# Patient Record
Sex: MALE | Race: WHITE | NOT HISPANIC OR LATINO | Employment: STUDENT | URBAN - METROPOLITAN AREA
[De-identification: names, ages, dates, MRNs, and addresses within clinical notes are randomized per-mention and may not be internally consistent; named-entity substitution may affect disease eponyms.]

---

## 2019-07-23 ENCOUNTER — CLINICAL SUPPORT (OUTPATIENT)
Dept: PEDIATRIC CARDIOLOGY | Facility: CLINIC | Age: 19
End: 2019-07-23
Attending: PEDIATRICS
Payer: COMMERCIAL

## 2019-07-23 ENCOUNTER — OFFICE VISIT (OUTPATIENT)
Dept: PEDIATRIC CARDIOLOGY | Facility: CLINIC | Age: 19
End: 2019-07-23
Payer: COMMERCIAL

## 2019-07-23 ENCOUNTER — CLINICAL SUPPORT (OUTPATIENT)
Dept: PEDIATRIC CARDIOLOGY | Facility: CLINIC | Age: 19
End: 2019-07-23
Payer: COMMERCIAL

## 2019-07-23 VITALS
HEIGHT: 74 IN | OXYGEN SATURATION: 98 % | RESPIRATION RATE: 12 BRPM | WEIGHT: 189.5 LBS | HEART RATE: 60 BPM | BODY MASS INDEX: 24.32 KG/M2 | DIASTOLIC BLOOD PRESSURE: 58 MMHG | SYSTOLIC BLOOD PRESSURE: 118 MMHG

## 2019-07-23 DIAGNOSIS — R40.0 SLEEPINESS: ICD-10-CM

## 2019-07-23 DIAGNOSIS — R94.31 ABNORMAL EKG: Primary | ICD-10-CM

## 2019-07-23 DIAGNOSIS — R94.31 ABNORMAL EKG: ICD-10-CM

## 2019-07-23 DIAGNOSIS — R00.1 BRADYCARDIA: ICD-10-CM

## 2019-07-23 DIAGNOSIS — I45.10 RBBB: Primary | ICD-10-CM

## 2019-07-23 DIAGNOSIS — R59.0 LYMPHADENOPATHY, INGUINAL: ICD-10-CM

## 2019-07-23 DIAGNOSIS — I45.10 RBBB: ICD-10-CM

## 2019-07-23 PROCEDURE — 93000 PR ELECTROCARDIOGRAM, COMPLETE: ICD-10-PCS | Mod: S$GLB,,, | Performed by: PEDIATRICS

## 2019-07-23 PROCEDURE — 93227 XTRNL ECG REC<48 HR R&I: CPT | Mod: S$GLB,,, | Performed by: PEDIATRICS

## 2019-07-23 PROCEDURE — 3008F PR BODY MASS INDEX (BMI) DOCUMENTED: ICD-10-PCS | Mod: CPTII,S$GLB,, | Performed by: PEDIATRICS

## 2019-07-23 PROCEDURE — 99205 PR OFFICE/OUTPT VISIT, NEW, LEVL V, 60-74 MIN: ICD-10-PCS | Mod: 25,S$GLB,, | Performed by: PEDIATRICS

## 2019-07-23 PROCEDURE — 93000 ELECTROCARDIOGRAM COMPLETE: CPT | Mod: S$GLB,,, | Performed by: PEDIATRICS

## 2019-07-23 PROCEDURE — 93227: ICD-10-PCS | Mod: S$GLB,,, | Performed by: PEDIATRICS

## 2019-07-23 PROCEDURE — 99205 OFFICE O/P NEW HI 60 MIN: CPT | Mod: 25,S$GLB,, | Performed by: PEDIATRICS

## 2019-07-23 PROCEDURE — 3008F BODY MASS INDEX DOCD: CPT | Mod: CPTII,S$GLB,, | Performed by: PEDIATRICS

## 2019-07-23 NOTE — PATIENT INSTRUCTIONS
Jonel Gu MD  Pediatric Cardiology  [address] [place] [address]  [phone]    Name: Baudilio Garza                   : 2000    Diagnosis:   1. RBBB    2. Bradycardia    3. Sleepiness    4. Lymphadenopathy, inguinal        Orders placed this encounter  Orders Placed This Encounter   Procedures    Holter Monitor - 48 hour Pediatrics    Cardiac treadmill stress test-Pediatrics       NEXT APPOINTMENT  Follow up in about 3 months (around 10/23/2019) for follow-up appointment, /, Stress test, Holter.    Special Testing Instructions: None.    Follow up with the primary care provider for the following issues: Nothing identified.             Plan:  1. Activity:No special precautions and may participate in age-appropriate activities.    2. The patient should see a dentist every 6 months for routine dental care.    No spontaneous bacterial endocarditis prophylaxis is required.    3. If anesthesia is needed for surgery, no special precautions from a cardiovascular standpoint are necessary.    Other recommendations:           General Guidelines    PCP: Shahram Dominguez MD  PCP Phone Number: [phone]    · If you have an emergency or you think you have an emergency, go to the nearest emergency room!     · Breathing too fast, doesnt look right, consistently not eating well, your child needs to be checked. These are general indications that your child is not feeling well. This may be caused by anything, a stomach virus, an ear ache or heart disease, so please call Shahram Dominguez MD. If Shahram Dominguez MD thinks you need to be checked for your heart, they will let us know.     · If your child experiences a rapid or very slow heart rate and has the following symptoms, call Shahram Dominguez MD or go to the nearest emergency room.   · unexplained chest pain   · does not look right   · feels like they are going to pass out   · actually passes out for unexplained reasons   · weakness  or fatigue   · shortness of breath  or breathing fast   · consistent poor feeding     · If your child experiences a rapid or very slow heart rate that lasts longer than 30 minutes call Shahram Dominguez MD or go to the nearest emergency room.     · If your child feels like they are going to pass out - have them sit down or lay down immediately. Raise the feet above the head (prop the feet on a chair or the wall) until the feeling passes. Slowly allow the child to sit, then stand. If the feeling returns, lay back down and start over.              It is very important that you notify Shahram Dominguez MD first. Shahram Dominguez MD or the ER Physician can reach Dr. Gu at the office or through [facility]ICU at [phone] as needed.

## 2019-07-23 NOTE — LETTER
July 24, 2019      Shahram Dominguez MD  [address] [facility] [address] Cardiology  [address]  [facility] [address]  Phone: [phone]  Fax: [phone]          Patient: Baudilio Garza   MR Number: [id number]   YOB: 2000   Date of Visit: 7/23/2019       Dear Dr. Shahram Dominguez:    Thank you for referring Baudilio Garza to me for evaluation. Attached you will find relevant portions of my assessment and plan of care.    If you have questions, please do not hesitate to call me. I look forward to following Baudilio Garza along with you.    Sincerely,    Jonel Gu MD    Enclosure  CC:  No Recipients    If you would like to receive this communication electronically, please contact [email or web address] or [phone] to request more information on [employer] access.    For providers and/or their staff who would like to refer a patient to [facility], please contact us through our one-stop-shop provider referral line, [facility], [place], at [phone].    If you feel you have received this communication in error or would no longer like to receive these types of communications, please e-mail [email or web address]

## 2019-07-24 NOTE — PROGRESS NOTES
[facility]  Baudilio Garza  2000    CC:   Chief Complaint   Patient presents with    Abnormal ECG         Baudilio Garza is a 19 y.o. male who comes for new patient consultation for abnormal EKG.  The patient was referred for evaluation by Shahram Dominguez MD. Baudilio is here today with his mother.    The patient has a history of sleeping class.  The patient describes being sleepy all of the time.  The patient recently dozed off while driving.  This occurred during the noon day.  The patient states he felt kind of sleepy prior to falling asleep.  The patient describes the amount of time he was asleep as a long blink.    The patient states that he typically sleeps 6-7 hours a night.  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  The patient is highly involved on campus.    The patient denies chest pain and palpitations.    The patient had an episode of syncope when he was 12 or 13 years of age.  He was in a movie theater and had not eaten lunch.  The patient passed out while walking to the restroom.    The patient raised concerns about swollen inguinal lymph nodes bilaterally.    The patient scored 16 points on the [place] sleepiness scale.     I reviewed his recent laboratory work that was done at [facility].  The patient does not have any anemia.  There is no evidence of thyroid dysfunction as he has a normal TSH.    The patient's father has a history of hypersomnolence and takes Ritalin on long drives so that he can stay awake.    Most Recent Cardiac Testin2019. Electrocardiogram, [facility]: Sinus rhythm, heart rate = 168 bpm, normal NC interval, QRS duration, and QTc (414 ms); sinus arrhythmia, right bundle branch block (QRS duration 132 milliseconds)  I personally reviewed and provided the interpretation for the the electrocardiogram.    2019.  Echocardiogram, [facility].    Normal segmental anatomy.  Normal biventricular size and qualitatively normal  systolic function.  No obvious atrial septal defect, ventricular septal defect, or patent ductus arteriosus.    No significant valvular  stenosis or regurgitation.    No evidence of aortic coarctation.    No pericardial effusion.        Laboratory and Other Testing:   None        Current Medications:      Medication List      as of 7/23/2019 11:59 PM     You have not been prescribed any medications.         Allergies: Review of patient's allergies indicates:  No Known Allergies    Family History   Problem Relation Age of Onset    Hypertension Mother     Anemia Sister     Hypertension Maternal Grandfather     Arrhythmia Neg Hx     Cardiomyopathy Neg Hx     Childhood respiratory disease Neg Hx     Clotting disorder Neg Hx     Congenital heart disease Neg Hx     Deafness Neg Hx     Early death Neg Hx     Heart attacks under age 50 Neg Hx     Long QT syndrome Neg Hx     Pacemaker/defibrilator Neg Hx     Premature birth Neg Hx     Seizures Neg Hx     SIDS Neg Hx      History reviewed. No pertinent past medical history.  Social History     Socioeconomic History    Marital status: Single     Spouse name: Not on file    Number of children: Not on file    Years of education: Not on file    Highest education level: Not on file   Occupational History    Not on file   Social Needs    Financial resource strain: Not on file    Food insecurity:     Worry: Not on file     Inability: Not on file    Transportation needs:     Medical: Not on file     Non-medical: Not on file   Tobacco Use    Smoking status: Not on file   Substance and Sexual Activity    Alcohol use: Not on file    Drug use: Not on file    Sexual activity: Not on file   Lifestyle    Physical activity:     Days per week: Not on file     Minutes per session: Not on file    Stress: Not on file   Relationships    Social connections:     Talks on phone: Not on file     Gets together: Not on file     Attends Confucianism service: Not on file      "Active member of club or organization: Not on file     Attends meetings of clubs or organizations: Not on file     Relationship status: Not on file   Other Topics Concern    Not on file   Social History Narrative    Baudilio lives with a roommate.  He attends [employer] and majoring in [employer].  Plays competitive football.  He enjoys studying, reading, and hanging out with his friends.     Past Surgical History:   Procedure Laterality Date    APPENDECTOMY  2006       Past medical history, family history, surgical history, social history updated and reviewed today.     ROS   Adult     General: No weight loss; No fever;  excess fatigue  HEENT: No headaches; No rhinorrhea; No earache  CV: Heart Murmur; No chest pain; No exercise intolerance; No palpitations; No diaphoresis  Respiratory: No wheezing; No chronic cough; No dyspnea  GI: No nausea; No vomiting; No constipation; No diarrhea; No reflux symptoms; Good appetite  : No hematuria; No dysuria  Musculoskeletal: No joint pains; No swollen joints  Skin: No rash  Neurologic: No fainting; No weakness; No seizures; No dizziness  Psychologic: Able to concentrate; Able to focus on tasks; No psychiatric concerns   Endocrinologic: No polyuria; No excess thirst (polydipsia); No temperature intolerance   Hematologic: No bruising; No bleeding        Objective:   Vitals:    07/23/19 1457   BP: (!) 118/58   BP Location: Right arm   Patient Position: Lying   BP Method: Large (Manual)   Pulse: 60   Resp: 12   SpO2: 98%   Weight: 86 kg (189 lb 8 oz)   Height: 6' 1.98" (1.879 m)         Physical Exam  GENERAL: Awake, Cooperative with exam,, well-developed well-nourished, no apparent distress  HEENT: mucous membranes moist and pink, normocephalic, no carotid bruits, sclera anicteric  NECK:  no lymphadenopathy  CHEST: Good air movement, clear to auscultation bilaterally  CARDIOVASCULAR: Quiet precordium, regular rate and rhythm, normal S1, normally split S2, No S3 or S4, No " murmur.   ABDOMEN: Soft, non-tender, non-distended, no hepatosplenomegaly.  EXTREMITIES: Warm well perfused, 2+ radial/pedal/femoral, pulses, capillary refill 2 seconds, no clubbing, cyanosis, or edema  NEURO:  Face symmetric, moves all extremities well.  Skin: pink, good turgor, no rash         Assessment:  1. RBBB    2. Bradycardia    3. Sleepiness    4. Lymphadenopathy, inguinal        Discussion:     I have reviewed our general guidelines related to cardiac issues with the family.  I instructed them in the event of an emergency to call 911 or go to the nearest emergency room.  They know to contact the PCP if problems arise or if they are in doubt.    The patient has a right bundle branch block.  The patient also has bradycardia.  The patient's bradycardia is likely related to his athleticism.  I discussed the patient's case with both Dr. Subramanian and Dr. Max.    The patient does not need any restrictions from activity at this time.  The patient has never had any episode of syncope while participating in sports.  I do feel the patient needs a 48 hour Holter monitor and a stress test.    The patient's mother was able to show me a ECG that he had obtain while he was still high school student at [employer] that demonstrated a right bundle branch block pattern at that time.    With regards to the patient's sleepiness, I recommended a formal evaluation by a sleep specialist.  I discussed this recommendation with his primary care provider, Dr. Dominguez.    I also alerted his primary care physician to the bilateral matted inguinal lymph nodes.  There is no significant lymph nodes felt in the axillary and cervical chains.  I had my colleague, Dr. Subramanian, also examined the patient.  We are both concerned about these lymph nodes.  His primary care provider is planning to see the patient before he leaves [facility].     Follow up in about 3 months (around 10/23/2019) for follow-up appointment, /, Stress test,  Holter.    Special Testing Instructions: None.    Follow up with the primary care provider for the following issues: Nothing identified.             Plan:  1. Activity:No special precautions and may participate in age-appropriate activities.    2. The patient should see a dentist every 6 months for routine dental care.    No spontaneous bacterial endocarditis prophylaxis is required.    3. If anesthesia is needed for surgery, no special precautions from a cardiovascular standpoint are necessary.    4. Medications:   No current outpatient medications on file.     No current facility-administered medications for this visit.         5. Orders placed this encounter  Orders Placed This Encounter   Procedures    Holter Monitor - 48 hour Pediatrics    Cardiac treadmill stress test-Pediatrics       Follow-Up:     Follow up in about 3 months (around 10/23/2019) for follow-up appointment, /, Stress test, Holter.    This documentation was created using Dragon Natural Speaking voice recognition software. Content is subject to voice recognition errors.    Sincerely,  Jonel Gu MD, FAAP, FACC, FASE  Board Certified in Pediatric Cardiology

## 2019-07-26 ENCOUNTER — CLINICAL SUPPORT (OUTPATIENT)
Dept: PEDIATRIC CARDIOLOGY | Facility: CLINIC | Age: 19
End: 2019-07-26
Attending: PEDIATRICS
Payer: COMMERCIAL

## 2019-07-26 DIAGNOSIS — I45.10 RBBB: ICD-10-CM

## 2019-07-29 LAB
OHS CV EVENT MONITOR DAY: 0
OHS CV HOLTER LENGTH DECIMAL HOURS: 23.58
OHS CV HOLTER LENGTH HOURS: 23
OHS CV HOLTER LENGTH MINUTES: 35